# Patient Record
Sex: MALE | Race: WHITE | NOT HISPANIC OR LATINO | URBAN - METROPOLITAN AREA
[De-identification: names, ages, dates, MRNs, and addresses within clinical notes are randomized per-mention and may not be internally consistent; named-entity substitution may affect disease eponyms.]

---
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
## 2023-02-20 NOTE — PROCEDURE: ISOTRETINOIN MONITORING
How Severe Is Your Acne?: moderate Is This A New Presentation, Or A Follow-Up?: Acne Weight Units: pounds

## 2024-07-05 NOTE — PROCEDURE: ISOTRETINOIN MONITORING
Recheck as outpatient without exacerbation  - now normalized after high dose steroids     Counseling Text: I reviewed the side effect in detail. Patient should get monthly blood tests, not donate blood, not drive at night if vision affected, and not share medication.